# Patient Record
Sex: FEMALE | Race: WHITE | NOT HISPANIC OR LATINO | Employment: FULL TIME | URBAN - NONMETROPOLITAN AREA
[De-identification: names, ages, dates, MRNs, and addresses within clinical notes are randomized per-mention and may not be internally consistent; named-entity substitution may affect disease eponyms.]

---

## 2023-12-20 PROBLEM — H93.12 TINNITUS OF LEFT EAR: Status: ACTIVE | Noted: 2023-12-20

## 2023-12-26 ENCOUNTER — HOSPITAL ENCOUNTER (OUTPATIENT)
Dept: ULTRASOUND IMAGING | Facility: HOSPITAL | Age: 33
Discharge: HOME | End: 2023-12-26
Admitting: FAMILY MEDICINE
Payer: COMMERCIAL

## 2023-12-26 DIAGNOSIS — R10.11 RIGHT UPPER QUADRANT ABDOMINAL PAIN: Primary | ICD-10-CM

## 2023-12-26 PROCEDURE — 76705 ECHO EXAM OF ABDOMEN: CPT

## 2024-01-12 ENCOUNTER — CLINICAL SUPPORT (OUTPATIENT)
Dept: FAMILY MEDICINE CLINIC | Facility: CLINIC | Age: 34
End: 2024-01-12
Payer: COMMERCIAL

## 2024-01-12 ENCOUNTER — TELEPHONE (OUTPATIENT)
Dept: FAMILY MEDICINE CLINIC | Facility: CLINIC | Age: 34
End: 2024-01-12

## 2024-01-12 DIAGNOSIS — J02.9 SORE THROAT: ICD-10-CM

## 2024-01-12 DIAGNOSIS — R05.9 COUGH, UNSPECIFIED TYPE: Primary | ICD-10-CM

## 2024-01-12 DIAGNOSIS — R11.0 NAUSEA: ICD-10-CM

## 2024-01-12 DIAGNOSIS — R50.9 FEVER, UNSPECIFIED FEVER CAUSE: ICD-10-CM

## 2024-01-12 LAB
EXPIRATION DATE: ABNORMAL
EXPIRATION DATE: NORMAL
FLUAV AG UPPER RESP QL IA.RAPID: NOT DETECTED
FLUBV AG UPPER RESP QL IA.RAPID: DETECTED
INTERNAL CONTROL: ABNORMAL
INTERNAL CONTROL: NORMAL
Lab: ABNORMAL
Lab: NORMAL
S PYO AG THROAT QL: NEGATIVE
SARS-COV-2 AG UPPER RESP QL IA.RAPID: NOT DETECTED

## 2024-01-12 RX ORDER — OSELTAMIVIR PHOSPHATE 75 MG/1
75 CAPSULE ORAL 2 TIMES DAILY
Qty: 10 CAPSULE | Refills: 0 | Status: SHIPPED | OUTPATIENT
Start: 2024-01-12

## 2024-01-12 RX ORDER — OSELTAMIVIR PHOSPHATE 75 MG/1
75 CAPSULE ORAL 2 TIMES DAILY
Qty: 10 CAPSULE | Refills: 0 | Status: SHIPPED | OUTPATIENT
Start: 2024-01-12 | End: 2024-01-12 | Stop reason: SDUPTHER

## 2024-01-12 NOTE — PROGRESS NOTES
Patient presented to office for flu/covid and strep test. Patient tested positive for flu b. Patient notified by dr nix    <<--- Click to launch

## 2024-01-12 NOTE — TELEPHONE ENCOUNTER
Caller: Marco Menjivar    Relationship: Self    Best call back number: [phone]     Requested Prescriptions:   Requested Prescriptions     Pending Prescriptions Disp Refills    oseltamivir (Tamiflu) 75 MG capsule 10 capsule 0     Sig: Take 1 capsule by mouth 2 (Two) Times a Day.        Pharmacy where request should be sent: [facility] - [facility] [address] [facility] [phone] [facility] [phone] FX     Last office visit with prescribing clinician: 12/20/2023   Last telemedicine visit with prescribing clinician: Visit date not found   Next office visit with prescribing clinician: Visit date not found     Additional details provided by patient: CANCEL AT [email or web address] AND SEND TO [facility]    Does the patient have less than a 3 day supply:  [x] Yes  [] No    Would you like a call back once the refill request has been completed: [x] Yes [] No    If the office needs to give you a call back, can they leave a voicemail: [x] Yes [] No    Tunde Pérez Rep   01/12/24 13:11 CST

## 2024-01-16 DIAGNOSIS — R10.11 RIGHT UPPER QUADRANT ABDOMINAL PAIN: Primary | ICD-10-CM

## 2024-01-25 ENCOUNTER — HOSPITAL ENCOUNTER (OUTPATIENT)
Dept: NUCLEAR MEDICINE | Facility: HOSPITAL | Age: 34
Discharge: HOME | End: 2024-01-25
Payer: COMMERCIAL

## 2024-01-25 DIAGNOSIS — R10.11 RIGHT UPPER QUADRANT ABDOMINAL PAIN: ICD-10-CM

## 2024-01-25 PROCEDURE — 0 TECHNETIUM TC 99M MEBROFENIN KIT: Performed by: FAMILY MEDICINE

## 2024-01-25 PROCEDURE — 78226 HEPATOBILIARY SYSTEM IMAGING: CPT

## 2024-01-25 PROCEDURE — A9537 TC99M MEBROFENIN: HCPCS | Performed by: FAMILY MEDICINE

## 2024-01-25 RX ORDER — KIT FOR THE PREPARATION OF TECHNETIUM TC 99M MEBROFENIN 45 MG/10ML
1 INJECTION, POWDER, LYOPHILIZED, FOR SOLUTION INTRAVENOUS
Status: COMPLETED | OUTPATIENT
Start: 2024-01-25 | End: 2024-01-25

## 2024-01-25 RX ADMIN — MEBROFENIN 1 DOSE: 45 INJECTION, POWDER, LYOPHILIZED, FOR SOLUTION INTRAVENOUS at 14:50

## 2024-02-14 ENCOUNTER — PROCEDURE VISIT (OUTPATIENT)
Dept: OTOLARYNGOLOGY | Facility: CLINIC | Age: 34
End: 2024-02-14
Payer: COMMERCIAL

## 2024-02-14 ENCOUNTER — OFFICE VISIT (OUTPATIENT)
Dept: OTOLARYNGOLOGY | Facility: CLINIC | Age: 34
End: 2024-02-14
Payer: COMMERCIAL

## 2024-02-14 VITALS
BODY MASS INDEX: 24.75 KG/M2 | DIASTOLIC BLOOD PRESSURE: 96 MMHG | SYSTOLIC BLOOD PRESSURE: 127 MMHG | HEART RATE: 82 BPM | WEIGHT: 145 LBS | TEMPERATURE: 98.2 F | HEIGHT: 64 IN

## 2024-02-14 DIAGNOSIS — H91.8X3 ASYMMETRICAL HEARING LOSS: ICD-10-CM

## 2024-02-14 DIAGNOSIS — H93.12 TINNITUS OF LEFT EAR: ICD-10-CM

## 2024-02-14 DIAGNOSIS — H90.A12 CONDUCTIVE HEARING LOSS OF LEFT EAR WITH RESTRICTED HEARING OF RIGHT EAR: ICD-10-CM

## 2024-02-14 DIAGNOSIS — H90.12 CONDUCTIVE HEARING LOSS OF LEFT EAR WITH UNRESTRICTED HEARING OF RIGHT EAR: Primary | ICD-10-CM

## 2024-02-14 DIAGNOSIS — H93.13 TINNITUS OF BOTH EARS: Primary | ICD-10-CM

## 2024-02-14 PROCEDURE — 92557 COMPREHENSIVE HEARING TEST: CPT

## 2024-02-14 PROCEDURE — 92567 TYMPANOMETRY: CPT

## 2024-02-14 RX ORDER — FLUTICASONE PROPIONATE 50 MCG
2 SPRAY, SUSPENSION (ML) NASAL 2 TIMES DAILY
Qty: 16 G | Refills: 3 | Status: SHIPPED | OUTPATIENT
Start: 2024-02-14

## 2024-03-29 ENCOUNTER — TELEPHONE (OUTPATIENT)
Dept: GASTROENTEROLOGY | Facility: CLINIC | Age: 34
End: 2024-03-29
Payer: COMMERCIAL

## 2024-03-29 NOTE — TELEPHONE ENCOUNTER
I have sent 2 letters to pt re: recall labs and rec'd no response. I tried to call today to discuss with pt-was unable to reach them so I will send strike 3 letter to pt and noncompliant letter to PCP.

## 2024-04-01 ENCOUNTER — PATIENT MESSAGE (OUTPATIENT)
Dept: FAMILY MEDICINE CLINIC | Facility: CLINIC | Age: 34
End: 2024-04-01
Payer: COMMERCIAL

## 2024-04-01 RX ORDER — PREDNISONE 10 MG/1
TABLET ORAL
Qty: 15 TABLET | Refills: 0 | Status: SHIPPED | OUTPATIENT
Start: 2024-04-01

## 2025-07-17 ENCOUNTER — APPOINTMENT (OUTPATIENT)
Dept: ULTRASOUND IMAGING | Facility: HOSPITAL | Age: 35
End: 2025-07-17
Payer: COMMERCIAL

## 2025-07-17 ENCOUNTER — HOSPITAL ENCOUNTER (EMERGENCY)
Facility: HOSPITAL | Age: 35
Discharge: HOME | End: 2025-07-17
Payer: COMMERCIAL

## 2025-07-17 VITALS
HEIGHT: 64 IN | DIASTOLIC BLOOD PRESSURE: 71 MMHG | WEIGHT: 168.5 LBS | HEART RATE: 69 BPM | BODY MASS INDEX: 28.77 KG/M2 | RESPIRATION RATE: 16 BRPM | SYSTOLIC BLOOD PRESSURE: 119 MMHG | TEMPERATURE: 98.4 F | OXYGEN SATURATION: 97 %

## 2025-07-17 DIAGNOSIS — Z3A.01 LESS THAN 8 WEEKS GESTATION OF PREGNANCY: ICD-10-CM

## 2025-07-17 DIAGNOSIS — O41.8X10 SUBCHORIONIC HEMATOMA IN FIRST TRIMESTER, SINGLE OR UNSPECIFIED FETUS: ICD-10-CM

## 2025-07-17 DIAGNOSIS — O46.8X1 SUBCHORIONIC HEMATOMA IN FIRST TRIMESTER, SINGLE OR UNSPECIFIED FETUS: ICD-10-CM

## 2025-07-17 DIAGNOSIS — Z67.91 RH NEGATIVE STATUS DURING PREGNANCY IN FIRST TRIMESTER: ICD-10-CM

## 2025-07-17 DIAGNOSIS — O26.891 RH NEGATIVE STATUS DURING PREGNANCY IN FIRST TRIMESTER: ICD-10-CM

## 2025-07-17 DIAGNOSIS — O20.0 THREATENED ABORTION: Primary | ICD-10-CM

## 2025-07-17 LAB
ALBUMIN SERPL-MCNC: 4.3 G/DL (ref 3.5–5.2)
ALBUMIN/GLOB SERPL: 1.7 G/DL
ALP SERPL-CCNC: 60 U/L (ref 39–117)
ALT SERPL W P-5'-P-CCNC: 114 U/L (ref 1–33)
ANION GAP SERPL CALCULATED.3IONS-SCNC: 12 MMOL/L (ref 5–15)
APTT PPP: 27.6 SECONDS (ref 24.5–36)
AST SERPL-CCNC: 37 U/L (ref 1–32)
BASOPHILS # BLD AUTO: 0.05 10*3/MM3 (ref 0–0.2)
BASOPHILS NFR BLD AUTO: 0.8 % (ref 0–1.5)
BILIRUB SERPL-MCNC: 0.2 MG/DL (ref 0–1.2)
BILIRUB UR QL STRIP: NEGATIVE
BUN SERPL-MCNC: 5.8 MG/DL (ref 6–20)
BUN/CREAT SERPL: 11.8 (ref 7–25)
CALCIUM SPEC-SCNC: 9.3 MG/DL (ref 8.6–10.5)
CHLORIDE SERPL-SCNC: 105 MMOL/L (ref 98–107)
CLARITY UR: CLEAR
CO2 SERPL-SCNC: 22 MMOL/L (ref 22–29)
COLOR UR: YELLOW
CREAT SERPL-MCNC: 0.49 MG/DL (ref 0.57–1)
DEPRECATED RDW RBC AUTO: 41.4 FL (ref 37–54)
EGFRCR SERPLBLD CKD-EPI 2021: 125.4 ML/MIN/1.73
EOSINOPHIL # BLD AUTO: 0.03 10*3/MM3 (ref 0–0.4)
EOSINOPHIL NFR BLD AUTO: 0.5 % (ref 0.3–6.2)
ERYTHROCYTE [DISTWIDTH] IN BLOOD BY AUTOMATED COUNT: 12.1 % (ref 12.3–15.4)
GLOBULIN UR ELPH-MCNC: 2.5 GM/DL
GLUCOSE SERPL-MCNC: 92 MG/DL (ref 65–99)
GLUCOSE UR STRIP-MCNC: NEGATIVE MG/DL
HCG INTACT+B SERPL-ACNC: 2193 MIU/ML
HCT VFR BLD AUTO: 37.1 % (ref 34–46.6)
HGB BLD-MCNC: 12.7 G/DL (ref 12–15.9)
HGB UR QL STRIP.AUTO: NEGATIVE
IMM GRANULOCYTES # BLD AUTO: 0.02 10*3/MM3 (ref 0–0.05)
IMM GRANULOCYTES NFR BLD AUTO: 0.3 % (ref 0–0.5)
INR PPP: 0.92 (ref 0.91–1.09)
KETONES UR QL STRIP: NEGATIVE
LEUKOCYTE ESTERASE UR QL STRIP.AUTO: NEGATIVE
LYMPHOCYTES # BLD AUTO: 1.92 10*3/MM3 (ref 0.7–3.1)
LYMPHOCYTES NFR BLD AUTO: 30.8 % (ref 19.6–45.3)
MAGNESIUM SERPL-MCNC: 2.1 MG/DL (ref 1.6–2.6)
MCH RBC QN AUTO: 31.8 PG (ref 26.6–33)
MCHC RBC AUTO-ENTMCNC: 34.2 G/DL (ref 31.5–35.7)
MCV RBC AUTO: 92.8 FL (ref 79–97)
MONOCYTES # BLD AUTO: 0.52 10*3/MM3 (ref 0.1–0.9)
MONOCYTES NFR BLD AUTO: 8.3 % (ref 5–12)
NEUTROPHILS NFR BLD AUTO: 3.7 10*3/MM3 (ref 1.7–7)
NEUTROPHILS NFR BLD AUTO: 59.3 % (ref 42.7–76)
NITRITE UR QL STRIP: NEGATIVE
NRBC BLD AUTO-RTO: 0 /100 WBC (ref 0–0.2)
NUMBER OF DOSES: NORMAL
PH UR STRIP.AUTO: 7 [PH] (ref 5–8)
PLATELET # BLD AUTO: 270 10*3/MM3 (ref 140–450)
PMV BLD AUTO: 9.5 FL (ref 6–12)
POTASSIUM SERPL-SCNC: 4 MMOL/L (ref 3.5–5.2)
PROT SERPL-MCNC: 6.8 G/DL (ref 6–8.5)
PROT UR QL STRIP: NEGATIVE
PROTHROMBIN TIME: 12.8 SECONDS (ref 11.8–14.8)
RBC # BLD AUTO: 4 10*6/MM3 (ref 3.77–5.28)
SODIUM SERPL-SCNC: 139 MMOL/L (ref 136–145)
SP GR UR STRIP: 1.01 (ref 1–1.03)
UROBILINOGEN UR QL STRIP: NORMAL
WBC NRBC COR # BLD AUTO: 6.24 10*3/MM3 (ref 3.4–10.8)

## 2025-07-17 PROCEDURE — 86900 BLOOD TYPING SEROLOGIC ABO: CPT

## 2025-07-17 PROCEDURE — 99284 EMERGENCY DEPT VISIT MOD MDM: CPT

## 2025-07-17 PROCEDURE — 84702 CHORIONIC GONADOTROPIN TEST: CPT

## 2025-07-17 PROCEDURE — 86901 BLOOD TYPING SEROLOGIC RH(D): CPT

## 2025-07-17 PROCEDURE — 36415 COLL VENOUS BLD VENIPUNCTURE: CPT

## 2025-07-17 PROCEDURE — 76817 TRANSVAGINAL US OBSTETRIC: CPT

## 2025-07-17 PROCEDURE — 85025 COMPLETE CBC W/AUTO DIFF WBC: CPT

## 2025-07-17 PROCEDURE — 85730 THROMBOPLASTIN TIME PARTIAL: CPT

## 2025-07-17 PROCEDURE — 80053 COMPREHEN METABOLIC PANEL: CPT

## 2025-07-17 PROCEDURE — 83735 ASSAY OF MAGNESIUM: CPT

## 2025-07-17 PROCEDURE — 86850 RBC ANTIBODY SCREEN: CPT

## 2025-07-17 PROCEDURE — 25010000002 RHO D IMMUNE GLOBULIN 1500 UNITS SOLUTION PREFILLED SYRINGE: Performed by: NURSE PRACTITIONER

## 2025-07-17 PROCEDURE — 81003 URINALYSIS AUTO W/O SCOPE: CPT

## 2025-07-17 PROCEDURE — 85610 PROTHROMBIN TIME: CPT

## 2025-07-17 PROCEDURE — 96372 THER/PROPH/DIAG INJ SC/IM: CPT

## 2025-07-17 RX ORDER — SODIUM CHLORIDE 0.9 % (FLUSH) 0.9 %
10 SYRINGE (ML) INJECTION AS NEEDED
Status: DISCONTINUED | OUTPATIENT
Start: 2025-07-17 | End: 2025-07-17 | Stop reason: HOSPADM

## 2025-07-17 RX ADMIN — HUMAN RHO(D) IMMUNE GLOBULIN 1500 UNITS: 300 INJECTION, SOLUTION INTRAMUSCULAR at 18:50

## 2025-07-17 NOTE — ED NOTES
MEDICAL SCREENING    Reason for Visit: Patient is a 35-year-old female that presents to the ED for complaints of vaginal bleeding during pregnancy.  Patient reports that she had several positive home pregnancy test and followed up with her PCP office on 2025 where pregnancy was confirmed.  Patient reports she is approximately 5 weeks 5 days pregnant.  She reports last menstrual cycle on 2025.  She states that about 30 minutes prior to arrival she began experiencing low back pain vaginal bleeding. Patient reports bleeding is very light tint and minimal. Reports was only when wiping. Patient reports this is 3rd pregnancy with history of 1  and 1 miscarriage. Patient reports urinary frequency but denies any dysuria. Reports nausea no vomiting.     Patient initially seen in triage.  The patient was advised further evaluation and diagnostic testing will be needed, some of the treatment and testing will be initiated in the lobby in order to begin the process.  The patient will be returned to the waiting area for the time being and will  be reassessed by a subsequent ED provider.  The patient will be brought back to the treatment area in as timely manner as possible.       Sana Owen, APRN  [phone]

## 2025-07-17 NOTE — Clinical Note
[facility] EMERGENCY DEPARTMENT  [address] [place] [address] AVE  [facility] [address]  Phone: [phone]    Marco Menjivar was seen and treated in our emergency department on 7/17/2025.  She may return to work on 07/18/2025.  Patient needs to be released by OBGYN before returning to work       Thank you for choosing [facility].    Deena Newman, APRN

## 2025-07-17 NOTE — Clinical Note
[facility] EMERGENCY DEPARTMENT  [address] [place] [address] AVE  [facility] [address]  Phone: [phone]    Marco Menjivar was seen and treated in our emergency department on 7/17/2025.  She may return to work on 07/18/2025.  Patient needs to be released by OBGYN before returning to work       Thank you for choosing [facility].    Deena Newman, APRN

## 2025-07-18 ENCOUNTER — OFFICE VISIT (OUTPATIENT)
Age: 35
End: 2025-07-18
Payer: COMMERCIAL

## 2025-07-18 VITALS
WEIGHT: 137 LBS | HEIGHT: 64 IN | BODY MASS INDEX: 23.39 KG/M2 | DIASTOLIC BLOOD PRESSURE: 70 MMHG | SYSTOLIC BLOOD PRESSURE: 112 MMHG

## 2025-07-18 DIAGNOSIS — N93.9 VAGINAL BLEEDING: Primary | ICD-10-CM

## 2025-07-18 LAB
ABO GROUP BLD: NORMAL
BLD GP AB SCN SERPL QL: NEGATIVE
RH BLD: NEGATIVE

## 2025-07-18 RX ORDER — DEXTROAMPHETAMINE SACCHARATE, AMPHETAMINE ASPARTATE MONOHYDRATE, DEXTROAMPHETAMINE SULFATE AND AMPHETAMINE SULFATE 6.25; 6.25; 6.25; 6.25 MG/1; MG/1; MG/1; MG/1
1 CAPSULE, EXTENDED RELEASE ORAL DAILY
COMMUNITY
Start: 2025-07-03

## 2025-07-18 NOTE — PROGRESS NOTES
Subjective   Marco Menjivar is a 35 y.o. female.     History of Present Illness  The patient presents to [facility] office today for vaginal bleeding with history of recent positive pregnancy test at home. Patient was seen in the ER for vaginal bleeding.  Vaginal Bleeding  The patient's primary symptoms include vaginal bleeding. The patient's pertinent negatives include no genital itching, genital lesions, genital odor, genital rash, missed menses, pelvic pain or vaginal discharge. This is a new problem. The current episode started in the past 7 days. The problem occurs intermittently. The problem has been unchanged. The patient is experiencing no pain. The problem affects the vaginal only side. Pertinent negatives include no abdominal pain, anorexia, back pain, chills, constipation, diarrhea, discolored urine, dysuria, fever, flank pain, frequency, headaches, hematuria, joint pain, joint swelling, nausea, painful intercourse, rash, sore throat, urgency or vomiting. The vaginal discharge was brown (pink). The vaginal bleeding is lighter than menses. She has not been passing clots. She has not been passing tissue. Nothing aggravates the symptoms. She has tried nothing for the symptoms. She is sexually active. She uses nothing for contraception.            Review of Systems   Constitutional: Negative.  Negative for chills and fever.   HENT: Negative.  Negative for sore throat.    Eyes: Negative.    Respiratory: Negative.     Cardiovascular: Negative.    Gastrointestinal: Negative.  Negative for abdominal pain, anorexia, constipation, diarrhea, nausea and vomiting.   Endocrine: Negative.    Genitourinary:  Positive for vaginal bleeding. Negative for dysuria, flank pain, frequency, hematuria, missed menses, pelvic pain, urgency and vaginal discharge.   Musculoskeletal: Negative.  Negative for back pain and joint pain.   Skin: Negative.  Negative for rash.   Allergic/Immunologic: Negative.    Neurological:  Negative.    Hematological: Negative.    Psychiatric/Behavioral: Negative.         Objective   Physical Exam  Vitals and nursing note reviewed.   Constitutional:       General: She is not in acute distress.     Appearance: Normal appearance. She is not ill-appearing or diaphoretic.   HENT:      Head: Normocephalic and atraumatic.   Pulmonary:      Effort: Pulmonary effort is normal. No respiratory distress.   Musculoskeletal:         General: No deformity.      Cervical back: Normal range of motion.   Skin:     Coloration: Skin is not pale.   Neurological:      General: No focal deficit present.      Mental Status: She is alert.   Psychiatric:         Mood and Affect: Mood normal.         Behavior: Behavior normal.         Thought Content: Thought content normal.         Judgment: Judgment normal.       Assessment & Plan   Diagnoses and all orders for this visit:    1. Vaginal bleeding (Primary)  Comments:  She presents to the office today for ER follow up due to recent vaginal bleeding. She reports vaginal bleeding started yesterday as dark brown and mostly with wiping. Discharge has now transitioned to more of a light pink color. Patient reports her LMP was 6/8/25 with positive UPT 7/7/25. Ultrasound performed today does show a GS present without YS or FP visualized. Recent HCG levels yesterday were 2,193. Patient denies recent SI or infection symptoms. Discussed ultrasound findings with patient and reassured her that these are consistent with early pregnancy. I would like to see her back in 1 week with repeat ultrasound for viability. As this is an early pregnancy, and her job requirements are strenuous at times, will provide letter for lifting and overtime restrictions. Will review this in the future to amend.        BMI is within normal parameters. No other follow-up for BMI required.       Frances Marino, MP

## 2025-07-18 NOTE — DISCHARGE INSTRUCTIONS
Outpatient HCG quant level on Monday (today level is 2,193)  F/u with OBGYN for re-evaluation  Remember to remain on bed rest and avoid intercourse until released by your PCP or OBGYN    I hope you feel better soon!

## 2025-07-18 NOTE — ED PROVIDER NOTES
Subjective   History of Present Illness  Patient is a 35-year-old female who presents to the ER with chief complaints of vaginal bleeding in pregnancy.  She states that she is approximately 5 weeks pregnant,  3 para 0.  She states today she began experiencing vaginal bleeding described as scant in nature, no clots, reports noticing blood on toilet paper with wiping.  She has not seen OB/GYN as of yet.  Patient is awaiting appointment however has a referral in place.  Last date of last menstrual period was .  Patient reports lower back pain, no abdominal pain.  Denies any nausea, vomiting, or diarrhea.  Denies any dysuria.  She has had no recorded fevers.  Due to symptoms described came to the emergency department for further evaluation and treatment.  Past medical history significant for fatty liver and ADHD        Review of Systems   Constitutional: Negative.    HENT: Negative.     Eyes: Negative.    Respiratory: Negative.     Cardiovascular: Negative.    Gastrointestinal:  Positive for abdominal pain.   Genitourinary:  Positive for vaginal bleeding. Negative for vaginal discharge.   Musculoskeletal:  Positive for back pain.   Skin: Negative.    All other systems reviewed and are negative.      Past Medical History:   Diagnosis Date    ADHD (attention deficit hyperactivity disorder)     Fatty liver        Allergies   Allergen Reactions    Bee Venom Anaphylaxis    Butler Extract [Butler Oil]     Poison Sumac Extract Rash       Past Surgical History:   Procedure Laterality Date    DILATATION AND CURETTAGE      LEG SURGERY Right     TONSILLECTOMY         Family History   Problem Relation Age of Onset    Liver cancer Maternal Aunt     Colon cancer Neg Hx     Colon polyps Neg Hx     Esophageal cancer Neg Hx     Liver disease Neg Hx     Stomach cancer Neg Hx     Rectal cancer Neg Hx        Social History     Socioeconomic History    Marital status:    Tobacco Use    Smoking status: Former     Types:  Cigarettes    Smokeless tobacco: Never   Vaping Use    Vaping status: Never Used   Substance and Sexual Activity    Alcohol use: Not Currently    Drug use: No    Sexual activity: Yes     Partners: Male     Birth control/protection: None           Objective   Physical Exam  Vitals and nursing note reviewed.   Constitutional:       Appearance: Normal appearance. She is well-developed.   HENT:      Head: Normocephalic and atraumatic.      Right Ear: External ear normal.      Left Ear: External ear normal.      Nose: Nose normal.      Mouth/Throat:      Pharynx: Oropharynx is clear.   Eyes:      Extraocular Movements: Extraocular movements intact.      Conjunctiva/sclera: Conjunctivae normal.   Cardiovascular:      Rate and Rhythm: Normal rate and regular rhythm.      Heart sounds: Normal heart sounds.   Pulmonary:      Effort: Pulmonary effort is normal.      Breath sounds: Normal breath sounds.   Abdominal:      General: Bowel sounds are normal. There is no distension.      Palpations: Abdomen is soft.      Tenderness: There is no abdominal tenderness. There is no guarding.   Musculoskeletal:         General: Normal range of motion.      Cervical back: Normal range of motion and neck supple.   Skin:     General: Skin is warm and dry.      Capillary Refill: Capillary refill takes less than 2 seconds.   Neurological:      Mental Status: She is alert and oriented to person, place, and time.   Psychiatric:         Mood and Affect: Mood normal.         Behavior: Behavior normal.         Thought Content: Thought content normal.         Judgment: Judgment normal.         Procedures           ED Course                                                       Medical Decision Making  Patient is a 35-year-old female who presents to the ER with chief complaints of vaginal bleeding in pregnancy.  She states that she is approximately 5 weeks pregnant,  3 para 0.  She states today she began experiencing vaginal bleeding  described as scant in nature, no clots, reports noticing blood on toilet paper with wiping.  She has not seen OB/GYN as of yet.  Patient is awaiting appointment however has a referral in place.  Last date of last menstrual period was .  Patient reports lower back pain, no abdominal pain.  Denies any nausea, vomiting, or diarrhea.  Denies any dysuria.  She has had no recorded fevers.  Due to symptoms described came to the emergency department for further evaluation and treatment.  Past medical history significant for fatty liver and ADHD  Differential diagnosis includes but not limited to threatened , subchorionic hematoma, , UTI, and other etiologies    Problems Addressed:  Less than 8 weeks gestation of pregnancy: complicated acute illness or injury  Rh negative status during pregnancy in first trimester: complicated acute illness or injury  Subchorionic hematoma in first trimester, single or unspecified fetus: complicated acute illness or injury  Threatened : complicated acute illness or injury    Amount and/or Complexity of Data Reviewed  Labs: ordered.  Radiology: ordered.    Risk  Prescription drug management.      Labs Reviewed   COMPREHENSIVE METABOLIC PANEL - Abnormal; Notable for the following components:       Result Value    BUN 5.8 (*)     Creatinine 0.49 (*)     ALT (SGPT) 114 (*)     AST (SGOT) 37 (*)     All other components within normal limits    Narrative:     GFR Categories in Chronic Kidney Disease (CKD)              GFR Category          GFR (mL/min/1.73)    Interpretation  G1                    90 or greater        Normal or high (1)  G2                    60-89                Mild decrease (1)  G3a                   45-59                Mild to moderate decrease  G3b                   30-44                Moderate to severe decrease  G4                    15-29                Severe decrease  G5                    14 or less           Kidney failure    (1)In the  absence of evidence of kidney disease, neither GFR category G1 or G2 fulfill the criteria for CKD.    eGFR calculation  CKD-EPI creatinine equation, which does not include race as a factor   CBC WITH AUTO DIFFERENTIAL - Abnormal; Notable for the following components:    RDW 12.1 (*)     All other components within normal limits   PROTIME-INR - Normal   APTT - Normal    Narrative:     PTT = The equivalent PTT values for the therapeutic range of heparin levels at 0.3 to 0.7 U/ml are 77 - 99 seconds.   URINALYSIS W/ CULTURE IF INDICATED - Normal    Narrative:     In absence of clinical symptoms, the presence of pyuria, bacteria, and/or nitrites on the urinalysis result does not correlate with infection.  Urine microscopic not indicated.   MAGNESIUM - Normal   HCG, QUANTITATIVE, PREGNANCY    Narrative:     HCG Ranges by Gestational Age    Females - non-pregnant premenopausal   </= 1mIU/mL HCG  Females - postmenopausal               </= 7mIU/mL HCG    3 Weeks         5.8 -    71.2 mIU/mL  4 Weeks         9.5 -     750 mIU/mL  5 Weeks         217 -   7,138 mIU/mL  6 Weeks         158 -  31,795 mIU/mL  7 Weeks       3,697 - 163,563 mIU/mL  8 Weeks      32,065 - 149,571 mIU/mL  9 Weeks      63,803 - 151,410 mIU/mL  10 Weeks     46,509 - 186,977 mIU/mL  12 Weeks     27,832 - 210,612 mIU/mL  14 Weeks     13,950 -  62,530 mIU/mL  15 Weeks     12,039 -  70,971 mIU/mL  16 Weeks      9,040 -  56,451 mIU/mL  17 Weeks      8,175 -  55,868 mIU/mL  18 Weeks      8,099 -  58,176 mIU/mL    RHIG EVALUATION   DOSES OF RH IMMUNE GLOBULIN   CBC AND DIFFERENTIAL    Narrative:     The following orders were created for panel order CBC & Differential.  Procedure                               Abnormality         Status                     ---------                               -----------         ------                     CBC Auto Differential[[id number]]        Abnormal            Final result                 Please view results  for these tests on the individual orders.      US Ob Transvaginal   Final Result   1. Single intrauterine gestational sac with estimated gestational age 5   weeks 2 days. No fetal pole identified at this time. Recommend continued   clinical and imaging follow-up.   2. Small 5 x 3 x 2 mm perigestational fluid collection, likely   subchorionic hematoma.           This report was signed and finalized on 2025 8:02 PM by Dr. Rishabh Chacon MD.          Labs are unremarkable.  Urinalysis is negative for infection.  Patient does have an Rh- blood type and received RhoGAM in the emergency department.  She has a small subchorionic hematoma.  We recommend to maintain bedrest and avoid any intercourse until released by PCP/OB/GYN.  She will need repeat hCG quantitative level in the next few days which we have ordered as an outpatient.  She will be discharged home shortly in stable condition.  Final diagnoses:   Threatened    Subchorionic hematoma in first trimester, single or unspecified fetus   Less than 8 weeks gestation of pregnancy   Rh negative status during pregnancy in first trimester       ED Disposition  ED Disposition       ED Disposition   Discharge    Condition   Good    Comment   --               No follow-up provider specified.       Medication List      No changes were made to your prescriptions during this visit.            Deena Newman, APRN  [phone]

## 2025-07-25 ENCOUNTER — OFFICE VISIT (OUTPATIENT)
Age: 35
End: 2025-07-25
Payer: COMMERCIAL

## 2025-07-25 VITALS
SYSTOLIC BLOOD PRESSURE: 118 MMHG | BODY MASS INDEX: 29.37 KG/M2 | HEIGHT: 64 IN | WEIGHT: 172 LBS | DIASTOLIC BLOOD PRESSURE: 82 MMHG

## 2025-07-25 DIAGNOSIS — E03.9 HYPOTHYROIDISM, UNSPECIFIED TYPE: ICD-10-CM

## 2025-07-25 DIAGNOSIS — O36.80X0 PREGNANCY WITH INCONCLUSIVE FETAL VIABILITY, NOT APPLICABLE OR UNSPECIFIED FETUS: Primary | ICD-10-CM

## 2025-07-25 NOTE — PROGRESS NOTES
"Chief Complaint   Patient presents with    Gynecologic Exam     Pt is here for a one week f/u with US to determine fetal viability.  Ultrasound on 7/18/25 showed IU pregnancy at unknown GS, only seen in uterus.  Denies any cramping, bleeding or complaints today.        History:  Marco Menjivar is a 35 y.o. female who presents today for follow-up for evaluation of the above:  Patient returns to the office today for 1 week follow up to check viability with ultrasound.         ROS:  Review of Systems   Constitutional: Negative.    HENT: Negative.     Eyes: Negative.    Respiratory: Negative.     Cardiovascular: Negative.    Gastrointestinal: Negative.    Endocrine: Negative.    Genitourinary: Negative.  Negative for difficulty urinating, dysuria, flank pain, frequency, pelvic pain, pelvic pressure, vaginal bleeding, vaginal discharge and vaginal pain.   Musculoskeletal: Negative.    Skin: Negative.    Allergic/Immunologic: Negative.    Neurological: Negative.    Hematological: Negative.    Psychiatric/Behavioral: Negative.         Ms. Menjivar  reports that she has quit smoking. Her smoking use included cigarettes. She has never used smokeless tobacco. She reports that she does not currently use alcohol. She reports that she does not use drugs.      Current Outpatient Medications:     Prenatal Vit-Fe Fumarate-FA (PRENATAL VITAMINS PO), , Disp: , Rfl:     amphetamine-dextroamphetamine XR (ADDERALL XR) 25 MG 24 hr capsule, Take 1 capsule by mouth Daily (Patient not taking: Reported on 7/25/2025), Disp: , Rfl:       OBJECTIVE:  /82   Ht 162.6 cm (64\")   Wt 78 kg (172 lb)   LMP 06/08/2025 (Exact Date)   BMI 29.52 kg/m²    Physical Exam  Vitals and nursing note reviewed.   Constitutional:       General: She is not in acute distress.     Appearance: Normal appearance. She is not ill-appearing or diaphoretic.   HENT:      Head: Normocephalic and atraumatic.   Pulmonary:      Effort: Pulmonary effort is " normal. No respiratory distress.   Musculoskeletal:         General: No deformity.      Cervical back: Normal range of motion.   Skin:     Coloration: Skin is not pale.   Neurological:      General: No focal deficit present.      Mental Status: She is alert.   Psychiatric:         Mood and Affect: Mood normal.         Behavior: Behavior normal.         Thought Content: Thought content normal.         Judgment: Judgment normal.       Assessment/Plan    Diagnoses and all orders for this visit:    1. Pregnancy with inconclusive fetal viability, not applicable or unspecified fetus (Primary)  Comments:  Patient returns to the office today for 1 week follow up with ultrasound due to positive pregnancy test at home (viability check). Ultrasound completed today does indicate a GS with YS, however, fetal pole and fht unable to be measured. When GS is compared to previous ultrasound on 7/1/2025, it has doubled in measurement. Discussed ultrasound findings with patient today. I do feel that viability is still early to determine and will have her return in 10 days for repeat ultrasound and office visit. She is requesting hcg levels today, will do so.  Orders:  -     HCG, B-subunit, Quantitative ([facility] Only); Future    2. Hypothyroidism, unspecified type  Comments:  Personal and family h/o hypothyroidism. Has been over 1 year since last checked. Patient denies ever having started any form of treatment. Will check these today.  Orders:  -     TSH  -     T3, Uptake  -     T4, Free          An After Visit Summary was printed and given to the patient at discharge.  Return in about 10 days (around 8/4/2025) for with OV and U/S. Sooner if problems arise.          Frances Marino, MP

## 2025-07-26 LAB
T3RU NFR SERPL: 24 % (ref 24–39)
T4 FREE SERPL-MCNC: 0.94 NG/DL (ref 0.82–1.77)
TSH SERPL DL<=0.005 MIU/L-ACNC: 3.81 UIU/ML (ref 0.45–4.5)

## 2025-08-01 ENCOUNTER — HOSPITAL ENCOUNTER (EMERGENCY)
Facility: HOSPITAL | Age: 35
Discharge: HOME | End: 2025-08-01
Payer: COMMERCIAL

## 2025-08-01 ENCOUNTER — APPOINTMENT (OUTPATIENT)
Dept: ULTRASOUND IMAGING | Facility: HOSPITAL | Age: 35
End: 2025-08-01
Payer: COMMERCIAL

## 2025-08-01 ENCOUNTER — TELEPHONE (OUTPATIENT)
Dept: OBSTETRICS AND GYNECOLOGY | Age: 35
End: 2025-08-01
Payer: COMMERCIAL

## 2025-08-01 VITALS
DIASTOLIC BLOOD PRESSURE: 68 MMHG | RESPIRATION RATE: 18 BRPM | OXYGEN SATURATION: 100 % | BODY MASS INDEX: 29.37 KG/M2 | SYSTOLIC BLOOD PRESSURE: 116 MMHG | WEIGHT: 172 LBS | HEIGHT: 64 IN | TEMPERATURE: 98.1 F | HEART RATE: 85 BPM

## 2025-08-01 DIAGNOSIS — O03.4 INEVITABLE ABORTION: Primary | ICD-10-CM

## 2025-08-01 LAB
ABO GROUP BLD: NORMAL
ALBUMIN SERPL-MCNC: 4.2 G/DL (ref 3.5–5.2)
ALBUMIN/GLOB SERPL: 1.6 G/DL
ALP SERPL-CCNC: 59 U/L (ref 39–117)
ALT SERPL W P-5'-P-CCNC: 67 U/L (ref 1–33)
ANION GAP SERPL CALCULATED.3IONS-SCNC: 12 MMOL/L (ref 5–15)
APTT PPP: 26.8 SECONDS (ref 24.5–36)
AST SERPL-CCNC: 26 U/L (ref 1–32)
BACTERIA UR QL AUTO: NORMAL /HPF
BASOPHILS # BLD AUTO: 0.03 10*3/MM3 (ref 0–0.2)
BASOPHILS NFR BLD AUTO: 0.5 % (ref 0–1.5)
BILIRUB SERPL-MCNC: <0.2 MG/DL (ref 0–1.2)
BILIRUB UR QL STRIP: NEGATIVE
BLD GP AB SCN SERPL QL: POSITIVE
BUN SERPL-MCNC: 11.6 MG/DL (ref 6–20)
BUN/CREAT SERPL: 21.5 (ref 7–25)
CALCIUM SPEC-SCNC: 9 MG/DL (ref 8.6–10.5)
CHLORIDE SERPL-SCNC: 104 MMOL/L (ref 98–107)
CLARITY UR: CLEAR
CO2 SERPL-SCNC: 23 MMOL/L (ref 22–29)
COLOR UR: YELLOW
CREAT SERPL-MCNC: 0.54 MG/DL (ref 0.57–1)
DEPRECATED RDW RBC AUTO: 40.1 FL (ref 37–54)
EGFRCR SERPLBLD CKD-EPI 2021: 122.5 ML/MIN/1.73
EOSINOPHIL # BLD AUTO: 0.07 10*3/MM3 (ref 0–0.4)
EOSINOPHIL NFR BLD AUTO: 1.2 % (ref 0.3–6.2)
ERYTHROCYTE [DISTWIDTH] IN BLOOD BY AUTOMATED COUNT: 11.9 % (ref 12.3–15.4)
GLOBULIN UR ELPH-MCNC: 2.6 GM/DL
GLUCOSE SERPL-MCNC: 103 MG/DL (ref 65–99)
GLUCOSE UR STRIP-MCNC: NEGATIVE MG/DL
HCG INTACT+B SERPL-ACNC: 1387 MIU/ML
HCT VFR BLD AUTO: 38.9 % (ref 34–46.6)
HGB BLD-MCNC: 13.5 G/DL (ref 12–15.9)
HGB UR QL STRIP.AUTO: ABNORMAL
HYALINE CASTS UR QL AUTO: NORMAL /LPF
IMM GRANULOCYTES # BLD AUTO: 0.03 10*3/MM3 (ref 0–0.05)
IMM GRANULOCYTES NFR BLD AUTO: 0.5 % (ref 0–0.5)
INR PPP: 0.9 (ref 0.91–1.09)
KETONES UR QL STRIP: NEGATIVE
LEUKOCYTE ESTERASE UR QL STRIP.AUTO: NEGATIVE
LIPASE SERPL-CCNC: 41 U/L (ref 13–60)
LYMPHOCYTES # BLD AUTO: 1.64 10*3/MM3 (ref 0.7–3.1)
LYMPHOCYTES NFR BLD AUTO: 28.8 % (ref 19.6–45.3)
MCH RBC QN AUTO: 32.1 PG (ref 26.6–33)
MCHC RBC AUTO-ENTMCNC: 34.7 G/DL (ref 31.5–35.7)
MCV RBC AUTO: 92.4 FL (ref 79–97)
MONOCYTES # BLD AUTO: 0.69 10*3/MM3 (ref 0.1–0.9)
MONOCYTES NFR BLD AUTO: 12.1 % (ref 5–12)
NEUTROPHILS NFR BLD AUTO: 3.24 10*3/MM3 (ref 1.7–7)
NEUTROPHILS NFR BLD AUTO: 56.9 % (ref 42.7–76)
NITRITE UR QL STRIP: NEGATIVE
NRBC BLD AUTO-RTO: 0 /100 WBC (ref 0–0.2)
NUMBER OF DOSES: NORMAL
PH UR STRIP.AUTO: 6.5 [PH] (ref 5–8)
PLATELET # BLD AUTO: 241 10*3/MM3 (ref 140–450)
PMV BLD AUTO: 8.8 FL (ref 6–12)
POTASSIUM SERPL-SCNC: 4.2 MMOL/L (ref 3.5–5.2)
PROT SERPL-MCNC: 6.8 G/DL (ref 6–8.5)
PROT UR QL STRIP: NEGATIVE
PROTHROMBIN TIME: 12.6 SECONDS (ref 11.8–14.8)
RBC # BLD AUTO: 4.21 10*6/MM3 (ref 3.77–5.28)
RBC # UR STRIP: NORMAL /HPF
REF LAB TEST METHOD: NORMAL
RESIDUAL RHIG DETECTED: NORMAL
RH BLD: NEGATIVE
SODIUM SERPL-SCNC: 139 MMOL/L (ref 136–145)
SP GR UR STRIP: 1.01 (ref 1–1.03)
SQUAMOUS #/AREA URNS HPF: NORMAL /HPF
UROBILINOGEN UR QL STRIP: ABNORMAL
WBC # UR STRIP: NORMAL /HPF
WBC NRBC COR # BLD AUTO: 5.7 10*3/MM3 (ref 3.4–10.8)

## 2025-08-01 PROCEDURE — 81001 URINALYSIS AUTO W/SCOPE: CPT

## 2025-08-01 PROCEDURE — 83690 ASSAY OF LIPASE: CPT

## 2025-08-01 PROCEDURE — 99284 EMERGENCY DEPT VISIT MOD MDM: CPT

## 2025-08-01 PROCEDURE — 86901 BLOOD TYPING SEROLOGIC RH(D): CPT

## 2025-08-01 PROCEDURE — 85610 PROTHROMBIN TIME: CPT

## 2025-08-01 PROCEDURE — 25010000002 RHO D IMMUNE GLOBULIN 1500 UNITS SOLUTION PREFILLED SYRINGE

## 2025-08-01 PROCEDURE — 76817 TRANSVAGINAL US OBSTETRIC: CPT

## 2025-08-01 PROCEDURE — 84702 CHORIONIC GONADOTROPIN TEST: CPT

## 2025-08-01 PROCEDURE — 86870 RBC ANTIBODY IDENTIFICATION: CPT

## 2025-08-01 PROCEDURE — 80053 COMPREHEN METABOLIC PANEL: CPT

## 2025-08-01 PROCEDURE — 86850 RBC ANTIBODY SCREEN: CPT

## 2025-08-01 PROCEDURE — 86900 BLOOD TYPING SEROLOGIC ABO: CPT

## 2025-08-01 PROCEDURE — 85025 COMPLETE CBC W/AUTO DIFF WBC: CPT

## 2025-08-01 PROCEDURE — 85730 THROMBOPLASTIN TIME PARTIAL: CPT

## 2025-08-01 PROCEDURE — 96372 THER/PROPH/DIAG INJ SC/IM: CPT

## 2025-08-01 RX ADMIN — HUMAN RHO(D) IMMUNE GLOBULIN 1500 UNITS: 300 INJECTION, SOLUTION INTRAMUSCULAR at 18:24

## 2025-08-01 NOTE — Clinical Note
[facility] EMERGENCY DEPARTMENT  [address] [place] [address] AVE  [facility] [address]  Phone: [phone]    Marco Menjivar was seen and treated in our emergency department on 8/1/2025.  She may return to work on 08/05/2025.         Thank you for choosing [facility].    Shira Reddy, RN

## 2025-08-01 NOTE — DISCHARGE INSTRUCTIONS
It was very nice to meet you, Marco. Thank you for allowing us to take care of you today at [facility].    Need to follow-up with OB/GYN, I would call them Monday morning.  Return to the ER with any new or worsening symptoms this weekend.  Follow-up with primary care doctor as well.    Please understand that an ER evaluation is just the start of your evaluation. We will do what we can, but we are often unable to fully figure out what is causing your symptoms from one evaluation. Thus, our primary goal is to determine whether you need to be evaluated in the hospital or if it is safe for you to go home and see other doctors such as a primary care physician or a specialist on an outpatient basis.     Like we discussed, it is VERY IMPORTANT that you follow up with your primary care doctor (call them to set up an appointment) within the next few days or as soon as possible so that you can be re-evaluated for improvement in your symptoms or for any other questions.     Please return to the emergency room within 12-48 hours if you experience fever, chills, chest pain or shortness of breath, pain with inspiration/expiration, pain that travels to your arms, neck or back, nausea, vomiting, severe headache, tearing pain in your chest, dizziness, feel as though you are about to pass out, have any worsening symptoms, or any other concerns.

## 2025-08-01 NOTE — TELEPHONE ENCOUNTER
Pt calling to report bright red bleeding today that has been increasing as well as mild pelvic pain and cramping. Pt is asking if she may be seen in office. Pt is advised to go to ER for evaluation of bleeding at this time. Pt voices understanding.

## 2025-08-01 NOTE — Clinical Note
[facility] EMERGENCY DEPARTMENT  [address] [place] [address] AVE  [facility] [address]  Phone: [phone]    Marco Menjivar was seen and treated in our emergency department on 8/1/2025.  She may return to work on 09/02/2025.         Thank you for choosing [facility].    Shira Reddy, RN

## 2025-08-01 NOTE — Clinical Note
[facility] EMERGENCY DEPARTMENT  [address] [place] [address] AVE  [facility] [address]  Phone: [phone]    Marco Menjivar was seen and treated in our emergency department on 8/1/2025.  She may return to work on 08/05/2025.         Thank you for choosing [facility].    Norma Cisneros, APRN

## 2025-08-01 NOTE — ED PROVIDER NOTES
Subjective   History of Present Illness  Patient is a 35-year-old female who presents to emergency department today for complaint of abdominal cramping and vaginal bleeding.  Patient states that she is 7 weeks and 6 days pregnant.  She states that she had a hCG quantitative drawn and it went down 400 points and she believes that she could be having a miscarriage.  She states that she started bleeding around 1400 today.  She states that it is bright red and has been a lot for her.  She states that it is not clots.  She states that she has had to receive RhoGAM before.  She states that she is G3, P0.  Denies any nausea, vomiting, diarrhea, chest pain, and shortness of breath, or any other symptoms.  Past medical history of ADHD and fatty liver.        Review of Systems   Gastrointestinal:  Positive for abdominal pain.   Genitourinary:  Positive for vaginal bleeding.   All other systems reviewed and are negative.      Past Medical History:   Diagnosis Date    ADHD (attention deficit hyperactivity disorder)     Fatty liver        Allergies   Allergen Reactions    Bee Venom Anaphylaxis    [place] Extract [[place] Oil]     Poison Sumac Extract Rash       Past Surgical History:   Procedure Laterality Date    DILATATION AND CURETTAGE      LEG SURGERY Right     TONSILLECTOMY AND ADENOIDECTOMY      WISDOM TOOTH EXTRACTION         Family History   Problem Relation Age of Onset    Liver cancer Maternal Aunt     Cancer Maternal Aunt         Liver    Thyroid disease Mother     Osteoporosis Mother     Cancer Maternal Grandfather         Leukemia    Osteoporosis Maternal Grandmother     Cancer Brother         Thyroid    Cancer Maternal Aunt         Liver    Cancer Brother         Thyroid    Colon cancer Neg Hx     Colon polyps Neg Hx     Esophageal cancer Neg Hx     Liver disease Neg Hx     Stomach cancer Neg Hx     Rectal cancer Neg Hx     Breast cancer Neg Hx     Ovarian cancer Neg Hx     Uterine cancer Neg Hx     Melanoma Neg Hx         Social History     Socioeconomic History    Marital status:    Tobacco Use    Smoking status: Former     Types: Cigarettes    Smokeless tobacco: Never   Vaping Use    Vaping status: Never Used   Substance and Sexual Activity    Alcohol use: Not Currently    Drug use: No    Sexual activity: Yes     Partners: Male     Birth control/protection: None           Objective   Physical Exam  Vitals and nursing note reviewed.   Constitutional:       General: She is not in acute distress.     Appearance: Normal appearance. She is obese. She is not ill-appearing, toxic-appearing or diaphoretic.   HENT:      Head: Normocephalic and atraumatic.      Right Ear: Tympanic membrane, ear canal and external ear normal. There is no impacted cerumen.      Left Ear: Tympanic membrane, ear canal and external ear normal. There is no impacted cerumen.      Nose: Nose normal. No congestion or rhinorrhea.      Mouth/Throat:      Mouth: Mucous membranes are moist.      Pharynx: Oropharynx is clear. No oropharyngeal exudate or posterior oropharyngeal erythema.   Eyes:      Extraocular Movements: Extraocular movements intact.      Conjunctiva/sclera: Conjunctivae normal.      Pupils: Pupils are equal, round, and reactive to light.   Cardiovascular:      Rate and Rhythm: Normal rate and regular rhythm.      Pulses: Normal pulses.      Heart sounds: Normal heart sounds. No murmur heard.     No gallop.   Pulmonary:      Effort: Pulmonary effort is normal. No respiratory distress.      Breath sounds: Normal breath sounds. No stridor. No wheezing, rhonchi or rales.   Chest:      Chest wall: No tenderness.   Abdominal:      General: Abdomen is flat. Bowel sounds are normal. There is no distension.      Palpations: Abdomen is soft. There is no mass.      Tenderness: There is abdominal tenderness in the right lower quadrant, periumbilical area, suprapubic area and left lower quadrant. There is no right CVA tenderness, left CVA tenderness,  guarding or rebound.      Hernia: No hernia is present.      Comments: Abdomen soft, tender to palpation suprapubic and periumbilical bilateral lower quadrants, no distention, no masses or hernias palpated, no CVA tenderness noted on exam.   Musculoskeletal:         General: No swelling, tenderness, deformity or signs of injury. Normal range of motion.      Cervical back: Normal range of motion and neck supple. No rigidity or tenderness.      Right lower leg: No edema.      Left lower leg: No edema.   Lymphadenopathy:      Cervical: No cervical adenopathy.   Skin:     General: Skin is warm and dry.      Capillary Refill: Capillary refill takes less than 2 seconds.      Coloration: Skin is not jaundiced or pale.      Findings: No bruising, erythema, lesion or rash.   Neurological:      General: No focal deficit present.      Mental Status: She is alert and oriented to person, place, and time. Mental status is at baseline.      GCS: GCS eye subscore is 4. GCS verbal subscore is 5. GCS motor subscore is 6.      Cranial Nerves: Cranial nerves 2-12 are intact. No cranial nerve deficit.      Sensory: Sensation is intact. No sensory deficit.      Motor: Motor function is intact. No weakness.      Coordination: Coordination is intact. Coordination normal.      Gait: Gait is intact. Gait normal.      Deep Tendon Reflexes: Reflexes are normal and symmetric. Reflexes normal.   Psychiatric:         Mood and Affect: Mood normal.         Behavior: Behavior normal.         Thought Content: Thought content normal.         Judgment: Judgment normal.         Procedures           ED Course  ED Course as of 08/02/25 1302   Fri Aug 01, 2025   1526 CBC, CMP, PT, INR, lipase, hCG qualitative, urinalysis, Rh evaluation, ultrasound OB transvaginal ordered. [BS]   1642 Ultrasound OB transvaginal  IMPRESSION:  1. Intrauterine gestational sac with visible yolk sac and small fetal  pole. No fetal heart tones yet measurable. Appropriate  follow-up per  OB/GYN.  2. 1.3 cm subchorionic hematoma along the inferior left aspect of the  gestational sac.  3. Trace nonspecific endocervical fluid.  4. No adnexal pathology identified.      [BS]   1642 Urinalysis shows small blood, no nitrites or leukocytes, no bacteria seen. [BS]   1643 CMP shows glucose 103, creatinine 0.54, ALT 67, lipase 41, PT/INR unremarkable, CBC shows no leukocytosis, hemoglobin 13.5. [BS]   1707 hCG quantitative 1387. [BS]   1728 Residual R HI detected, recommended RhoGAM was ordered. [BS]   1747 Discussed with Dr. Gonsalez, hCG quantitative is trending down, I will diagnose the patient with an inevitable miscarriage.  Rh was indicated, this was given.  Hemoglobin is stable at this time.  Urinalysis shows small blood, no infection.  I did offer her a pelvic exam which she did not want this to be performed because she did not think that there was that much blood, and she has an appoint with her OB/GYN on Monday.  The patient will follow-up with OB/GYN as scheduled, and I told her to call them first thing on Monday morning to make sure that they do not want to see her any sooner.  Instructed her to return to the ER this weekend if she has any new or worsening symptoms.  She was in agreement with this care plan and will be discharged home in a stable condition. [BS]      ED Course User Index  [BS] Norma Cisneros, MP                                                       Medical Decision Making  Problems Addressed:  Inevitable : complicated acute illness or injury    Amount and/or Complexity of Data Reviewed  Labs: ordered.  Radiology: ordered.    Risk  Prescription drug management.    Patient is a 35-year-old female who presents to emergency department today for complaint of abdominal cramping and vaginal bleeding.  Patient states that she is 7 weeks and 6 days pregnant.  She states that she had a hCG quantitative drawn and it went down 400 points and she believes that she could  be having a miscarriage.  She states that she started bleeding around 1400 today.  She states that it is bright red and has been a lot for her.  She states that it is not clots.  She states that she has had to receive RhoGAM before.  She states that she is G3, P0.  Denies any nausea, vomiting, diarrhea, chest pain, and shortness of breath, or any other symptoms.  Past medical history of ADHD and fatty liver.    Differential diagnoses include but are not limited to miscarriage, urinary tract infection, fetal demise, subchorionic hematoma, and other etiologies.    US Ob Transvaginal   Final Result   1. Intrauterine gestational sac with visible yolk sac and small fetal   pole. No fetal heart tones yet measurable. Appropriate follow-up per   OB/GYN.   2. 1.3 cm subchorionic hematoma along the inferior left aspect of the   gestational sac.   3. Trace nonspecific endocervical fluid.   4. No adnexal pathology identified.           This report was signed and finalized on 8/1/2025 4:07 PM by Dr. Kizzy Mireles MD.             Labs Reviewed   COMPREHENSIVE METABOLIC PANEL - Abnormal; Notable for the following components:       Result Value    Glucose 103 (*)     Creatinine 0.54 (*)     ALT (SGPT) 67 (*)     All other components within normal limits    Narrative:     GFR Categories in Chronic Kidney Disease (CKD)              GFR Category          GFR (mL/min/1.73)    Interpretation  G1                    90 or greater        Normal or high (1)  G2                    60-89                Mild decrease (1)  G3a                   45-59                Mild to moderate decrease  G3b                   30-44                Moderate to severe decrease  G4                    15-29                Severe decrease  G5                    14 or less           Kidney failure    (1)In the absence of evidence of kidney disease, neither GFR category G1 or G2 fulfill the criteria for CKD.    eGFR calculation 2021 CKD-EPI creatinine equation,  which does not include race as a factor   PROTIME-INR - Abnormal; Notable for the following components:    INR 0.90 (*)     All other components within normal limits   URINALYSIS W/ CULTURE IF INDICATED - Abnormal; Notable for the following components:    Blood, UA Small (1+) (*)     All other components within normal limits    Narrative:     In absence of clinical symptoms, the presence of pyuria, bacteria, and/or nitrites on the urinalysis result does not correlate with infection.   CBC WITH AUTO DIFFERENTIAL - Abnormal; Notable for the following components:    RDW 11.9 (*)     Monocyte % 12.1 (*)     All other components within normal limits   APTT - Normal    Narrative:     PTT = The equivalent PTT values for the therapeutic range of heparin levels at 0.3 to 0.7 U/ml are 77 - 99 seconds.   LIPASE - Normal   HCG, QUANTITATIVE, PREGNANCY    Narrative:     HCG Ranges by Gestational Age    Females - non-pregnant premenopausal   </= 1mIU/mL HCG  Females - postmenopausal               </= 7mIU/mL HCG    3 Weeks         5.8 -    71.2 mIU/mL  4 Weeks         9.5 -     750 mIU/mL  5 Weeks         217 -   7,138 mIU/mL  6 Weeks         158 -  31,795 mIU/mL  7 Weeks       3,697 - 163,563 mIU/mL  8 Weeks      32,065 - 149,571 mIU/mL  9 Weeks      63,803 - 151,410 mIU/mL  10 Weeks     46,509 - 186,977 mIU/mL  12 Weeks     27,832 - 210,612 mIU/mL  14 Weeks     13,950 -  62,530 mIU/mL  15 Weeks     12,039 -  70,971 mIU/mL  16 Weeks      9,040 -  56,451 mIU/mL  17 Weeks      8,175 -  55,868 mIU/mL  18 Weeks      8,099 -  58,176 mIU/mL   URINALYSIS, MICROSCOPIC ONLY    RHIG EVALUATION   DOSES OF RH IMMUNE GLOBULIN   ANTIBODY IDENTIFICATION   CBC AND DIFFERENTIAL    Narrative:     The following orders were created for panel order CBC & Differential.  Procedure                               Abnormality         Status                     ---------                               -----------         ------                      CBC Auto Differential[[id number]]        Abnormal            Final result                 Please view results for these tests on the individual orders.      Patient is a 35-year-old female who presents to emergency department today for complaint of abdominal cramping and vaginal bleeding.  Patient states that she is 7 weeks and 6 days pregnant.  She states that she had a hCG quantitative drawn and it went down 400 points and she believes that she could be having a miscarriage.  She states that she started bleeding around 1400 today.  She states that it is bright red and has been a lot for her.  She states that it is not clots.  She states that she has had to receive RhoGAM before.  She states that she is G3, P0.  On exam she is in no acute distress, normal appearance, obese, non-ill-appearing, nontoxic-appearing, nondiaphoretic. Abdomen soft, tender to palpation suprapubic and periumbilical bilateral lower quadrants, no distention, no masses or hernias palpated, no CVA tenderness noted on exam.  CBC, CMP, PT, INR, lipase, hCG qualitative, urinalysis, Rh evaluation, ultrasound OB transvaginal ordered.  Ultrasound OB transvaginal Intrauterine gestational sac with visible yolk sac and small fetal pole. No fetal heart tones yet measurable. Appropriate follow-up per OB/GYN. 1.3 cm subchorionic hematoma along the inferior left aspect of the gestational sac. Trace nonspecific endocervical fluid.  No adnexal pathology identified. Urinalysis shows small blood, no nitrites or leukocytes, no bacteria seen. CMP shows glucose 103, creatinine 0.54, ALT 67, lipase 41, PT/INR unremarkable, CBC shows no leukocytosis, hemoglobin 13.5. hCG quantitative 1387. Residual R HI detected, recommended RhoGAM was ordered. Discussed with Dr. Gonsalez, hCG quantitative is trending down, I will diagnose the patient with an inevitable miscarriage.  Rh was indicated, this was given.  Hemoglobin is stable at this time.  Urinalysis shows small blood,  no infection.  I did offer her a pelvic exam which she did not want this to be performed because she did not think that there was that much blood, and she has an appoint with her OB/GYN on Monday.   The patient will follow-up with OB/GYN as scheduled, and I told her to call them first thing on Monday morning to make sure that they do not want to see her any sooner.  Instructed her to return to the ER this weekend if she has any new or worsening symptoms.  She was in agreement with this care plan and will be discharged home in a stable condition.       Final diagnoses:   Inevitable        ED Disposition  ED Disposition       ED Disposition   Discharge    Condition   Stable    Comment   --               Kenna Benavidez PA   [facility] [id number]  [phone]    Schedule an appointment as soon as possible for a visit   follow up    [facility] EMERGENCY DEPARTMENT  [address] [place], [place] [id number] [phone]    If symptoms worsen, As needed    Frances Marino, APRN  [address] [facility] [facility] [address]  [place] [id number] [phone]    Schedule an appointment as soon as possible for a visit   follow up         Medication List      No changes were made to your prescriptions during this visit.            Norma Cisneros, APRN  [phone]

## 2025-08-01 NOTE — Clinical Note
[facility] EMERGENCY DEPARTMENT  [address] [place] [address] AVE  Merg[facility] with [facility] [address]  Phone: [phone]    Marco Menjivar was seen and treated in our emergency department on 8/1/2025.  She may return to work on 08/05/2025.         Thank you for choosing [facility].    Norma Cisneros, APRN

## 2025-08-04 ENCOUNTER — OFFICE VISIT (OUTPATIENT)
Age: 35
End: 2025-08-04
Payer: COMMERCIAL

## 2025-08-04 VITALS
SYSTOLIC BLOOD PRESSURE: 124 MMHG | DIASTOLIC BLOOD PRESSURE: 78 MMHG | WEIGHT: 170 LBS | BODY MASS INDEX: 30.12 KG/M2 | HEIGHT: 63 IN

## 2025-08-04 DIAGNOSIS — O03.9 MISCARRIAGE: Primary | ICD-10-CM

## 2025-08-04 DIAGNOSIS — Z31.9 PATIENT DESIRES PREGNANCY: ICD-10-CM

## 2025-08-04 PROCEDURE — 99213 OFFICE O/P EST LOW 20 MIN: CPT
